# Patient Record
Sex: FEMALE | Race: WHITE | NOT HISPANIC OR LATINO | Employment: FULL TIME | URBAN - METROPOLITAN AREA
[De-identification: names, ages, dates, MRNs, and addresses within clinical notes are randomized per-mention and may not be internally consistent; named-entity substitution may affect disease eponyms.]

---

## 2019-03-06 ENCOUNTER — CONSULT (OUTPATIENT)
Dept: GYNECOLOGIC ONCOLOGY | Facility: HOSPITAL | Age: 49
End: 2019-03-06

## 2019-03-06 VITALS
TEMPERATURE: 98.3 F | DIASTOLIC BLOOD PRESSURE: 80 MMHG | BODY MASS INDEX: 25.69 KG/M2 | WEIGHT: 154.2 LBS | SYSTOLIC BLOOD PRESSURE: 122 MMHG | HEIGHT: 65 IN | HEART RATE: 75 BPM

## 2019-03-06 DIAGNOSIS — D06.9 CIN III (CERVICAL INTRAEPITHELIAL NEOPLASIA GRADE III) WITH SEVERE DYSPLASIA: Primary | ICD-10-CM

## 2019-03-06 PROCEDURE — 99243 OFF/OP CNSLTJ NEW/EST LOW 30: CPT | Performed by: OBSTETRICS & GYNECOLOGY

## 2019-03-06 RX ORDER — ACETAMINOPHEN 500 MG
500 TABLET ORAL EVERY 6 HOURS PRN
COMMUNITY

## 2019-03-06 NOTE — ASSESSMENT & PLAN NOTE
41-year-old who does not desire future fertility with recurrent high-grade cervical dysplasia  Her performance status is 0   1  I discussed the pathophysiology of cervical dysplasia including the role tobacco use plays in progression to cancer  I encouraged her to quit smoking  2  Her ECC is positive for CARLOS 2, 3  I recommend hysterectomy with bilateral salpingectomy  She understands that postoperatively she would require Pap smears in follow-up  The likelihood that she has occult underlying malignancy is low  Thank you for the courtesy of this consultation  All questions were answered by the end of the visit

## 2019-03-06 NOTE — LETTER
March 6, 2019     Ulysses Homans, MD  [address]  [address] [facility] [address]    Patient: Festus Marmolejo   YOB: 1970   Date of Visit: 3/6/2019       Dear Dr Starr Feliciano: Thank you for referring Festus Marmolejo to me for evaluation  Below are my notes for this consultation  If you have questions, please do not hesitate to call me  I look forward to following your patient along with you  Sincerely,        Teo Rodas MD        CC: No Recipients  Teo Rodas MD  3/6/2019 11:32 AM  Sign at close encounter  Assessment/Plan:    Problem List Items Addressed This Visit        Genitourinary    CARLOS III (cervical intraepithelial neoplasia grade III) with severe dysplasia - Primary     16-year-old who does not desire future fertility with recurrent high-grade cervical dysplasia  Her performance status is 0   1  I discussed the pathophysiology of cervical dysplasia including the role tobacco use plays in progression to cancer  I encouraged her to quit smoking  2  Her ECC is positive for CARLOS 2, 3  I recommend hysterectomy with bilateral salpingectomy  She understands that postoperatively she would require Pap smears in follow-up  The likelihood that she has occult underlying malignancy is low  Thank you for the courtesy of this consultation  All questions were answered by the end of the visit  CHIEF COMPLAINT:  Recurrent high-grade dysplasia of the cervix          Patient ID: Festus Marmolejo is a 51 y o  female  16-year-old presents as a consultation from Dr Ulysses Homans for recurrent high-grade dysplasia of the cervix  In July of 2018, she had a LEEP procedure performed  The ECC was positive for CARLOS 2  She then had a follow-up Pap smear performed on 2/21/2019 that was high-grade with the ECC as positive for CARLOS 2, 3  She has oligomenorrhea  She does not bleed irregularly  She is a smoker  No pelvic pain  Review of Systems   Constitutional: Positive for fatigue  Genitourinary: Positive for pelvic pain and vaginal discharge  Musculoskeletal: Positive for arthralgias and myalgias  All other systems reviewed and are negative  Current Outpatient Medications   Medication Sig Dispense Refill    acetaminophen (TYLENOL) 500 mg tablet Take 500 mg by mouth every 6 (six) hours as needed for mild pain       No current facility-administered medications for this visit  No Known Allergies    No past medical history on file  Past Surgical History:   Procedure Laterality Date    CERVICAL BIOPSY  W/ LOOP ELECTRODE EXCISION         OB History        5    Para   4    Term                AB        Living   4       SAB        TAB        Ectopic        Multiple        Live Births               Obstetric Comments   Age at first period 7             Family History   Problem Relation Age of Onset    Colon cancer Mother        The following portions of the patient's history were reviewed and updated as appropriate: allergies, current medications, past family history, past medical history, past social history, past surgical history and problem list       Objective:    Blood pressure 122/80, pulse 75, temperature 98 3 °F (36 8 °C), temperature source Oral, height 5' 5" (1 651 m), weight 69 9 kg (154 lb 3 2 oz)  Body mass index is 25 66 kg/m²  Physical Exam   Constitutional: She is oriented to person, place, and time  She appears well-developed and well-nourished  No distress  HENT:   Head: Normocephalic and atraumatic  Neck: Normal range of motion  Neck supple  No thyromegaly present  Abdominal: Soft  She exhibits no distension and no mass  There is no tenderness  There is no rebound and no guarding  Genitourinary:   Genitourinary Comments: The external female genitalia is normal  The bartholin's, uretheral and skenes glands are normal  The urethral meatus is normal (midline with no lesions)  Anus without fissure or lesion   Speculum exam reveals vagina without lesion or discharge  Cervix is normal appearing without visible lesions  There is scarring present from her previous LEEP  No bleeding  No significant cystocele or rectocele noted  Bimanual exam notes a uterus with normal contour, mobility  No tenderness  Adnexa without masses or tenderness  Bladder is without fullness, mass or tenderness  Musculoskeletal: She exhibits no edema  Lymphadenopathy:     She has no cervical adenopathy  Neurological: She is alert and oriented to person, place, and time  Skin: Skin is warm and dry  She is not diaphoretic  Psychiatric: She has a normal mood and affect   Her behavior is normal  Judgment and thought content normal

## 2019-03-06 NOTE — PROGRESS NOTES
Assessment/Plan:    Problem List Items Addressed This Visit        Genitourinary    CARLOS III (cervical intraepithelial neoplasia grade III) with severe dysplasia - Primary     54-year-old who does not desire future fertility with recurrent high-grade cervical dysplasia  Her performance status is 0   1  I discussed the pathophysiology of cervical dysplasia including the role tobacco use plays in progression to cancer  I encouraged her to quit smoking  2  Her ECC is positive for CARLOS 2, 3  I recommend hysterectomy with bilateral salpingectomy  She understands that postoperatively she would require Pap smears in follow-up  The likelihood that she has occult underlying malignancy is low  Thank you for the courtesy of this consultation  All questions were answered by the end of the visit  CHIEF COMPLAINT:  Recurrent high-grade dysplasia of the cervix          Patient ID: Vivi Owens is a 51 y o  female  54-year-old presents as a consultation from Dr Maura Cordero for recurrent high-grade dysplasia of the cervix  In July of 2018, she had a LEEP procedure performed  The ECC was positive for CARLOS 2  She then had a follow-up Pap smear performed on 2/21/2019 that was high-grade with the ECC as positive for CARLOS 2, 3  She has oligomenorrhea  She does not bleed irregularly  She is a smoker  No pelvic pain  Review of Systems   Constitutional: Positive for fatigue  Genitourinary: Positive for pelvic pain and vaginal discharge  Musculoskeletal: Positive for arthralgias and myalgias  All other systems reviewed and are negative  Current Outpatient Medications   Medication Sig Dispense Refill    acetaminophen (TYLENOL) 500 mg tablet Take 500 mg by mouth every 6 (six) hours as needed for mild pain       No current facility-administered medications for this visit  No Known Allergies    No past medical history on file      Past Surgical History:   Procedure Laterality Date    CERVICAL BIOPSY  W/ LOOP ELECTRODE EXCISION         OB History        5    Para   4    Term                AB        Living   4       SAB        TAB        Ectopic        Multiple        Live Births               Obstetric Comments   Age at first period 7             Family History   Problem Relation Age of Onset    Colon cancer Mother        The following portions of the patient's history were reviewed and updated as appropriate: allergies, current medications, past family history, past medical history, past social history, past surgical history and problem list       Objective:    Blood pressure 122/80, pulse 75, temperature 98 3 °F (36 8 °C), temperature source Oral, height 5' 5" (1 651 m), weight 69 9 kg (154 lb 3 2 oz)  Body mass index is 25 66 kg/m²  Physical Exam   Constitutional: She is oriented to person, place, and time  She appears well-developed and well-nourished  No distress  HENT:   Head: Normocephalic and atraumatic  Neck: Normal range of motion  Neck supple  No thyromegaly present  Abdominal: Soft  She exhibits no distension and no mass  There is no tenderness  There is no rebound and no guarding  Genitourinary:   Genitourinary Comments: The external female genitalia is normal  The bartholin's, uretheral and skenes glands are normal  The urethral meatus is normal (midline with no lesions)  Anus without fissure or lesion  Speculum exam reveals vagina without lesion or discharge  Cervix is normal appearing without visible lesions  There is scarring present from her previous LEEP  No bleeding  No significant cystocele or rectocele noted  Bimanual exam notes a uterus with normal contour, mobility  No tenderness  Adnexa without masses or tenderness  Bladder is without fullness, mass or tenderness  Musculoskeletal: She exhibits no edema  Lymphadenopathy:     She has no cervical adenopathy  Neurological: She is alert and oriented to person, place, and time     Skin: Skin is warm and dry  She is not diaphoretic  Psychiatric: She has a normal mood and affect   Her behavior is normal  Judgment and thought content normal

## 2019-03-08 ENCOUNTER — DOCUMENTATION (OUTPATIENT)
Dept: HEMATOLOGY ONCOLOGY | Facility: CLINIC | Age: 49
End: 2019-03-08

## 2019-03-08 NOTE — PROGRESS NOTES
Spoke with pt regarding the fact that she does not have insurance  Pt is employed full time however she is not eligible for insurance through her employer until 1/2020  Pt agreed to meet with me on 3 14 19 in our [facility] office  We are going to complete [place] application together

## 2024-02-02 ENCOUNTER — TELEPHONE (OUTPATIENT)
Age: 54
End: 2024-02-02

## 2024-02-02 ENCOUNTER — PREP FOR PROCEDURE (OUTPATIENT)
Age: 54
End: 2024-02-02

## 2024-02-02 DIAGNOSIS — Z12.11 SCREENING FOR COLON CANCER: Primary | ICD-10-CM

## 2024-02-02 NOTE — TELEPHONE ENCOUNTER
02/02/24  Screened by: Yandy Castañeda    Referring Provider Emily Rivers    Pre- Screening:     There is no height or weight on file to calculate BMI.  Has patient been referred for a routine screening Colonoscopy? yes  Is the patient between 45-75 years old? yes      Previous Colonoscopy no   If yes:    Date:     Facility:     Reason:         Does the patient want to see a Gastroenterologist prior to their procedure OR are they having any GI symptoms? no    Has the patient been hospitalized or had abdominal surgery in the past 6 months? no    Does the patient use supplemental oxygen? no    Does the patient take Coumadin, Lovenox, Plavix, Elliquis, Xarelto, or other blood thinning medication? no    Has the patient had a stroke, cardiac event, or stent placed in the past year? no

## 2024-02-02 NOTE — TELEPHONE ENCOUNTER
Scheduled date of colonoscopy (as of today):4/8/24  Physician performing colonoscopy:Dr. Mae  Location of colonoscopy:[facility]  Bowel prep reviewed with patient:Jairo/Dul prep instr sent to pt email [email or web address]  Instructions reviewed with patient by:JUWAN  Clearances: N/A

## 2024-03-29 ENCOUNTER — TELEPHONE (OUTPATIENT)
Dept: GASTROENTEROLOGY | Facility: CLINIC | Age: 54
End: 2024-03-29

## 2024-03-29 NOTE — TELEPHONE ENCOUNTER
Patient called in to reschedule procedure due to a conflict in schedule patient is now rebecca on   4/19 DR BERNABE ALMONTE

## 2024-04-12 ENCOUNTER — TELEPHONE (OUTPATIENT)
Dept: GASTROENTEROLOGY | Facility: CLINIC | Age: 54
End: 2024-04-12

## 2024-04-19 ENCOUNTER — ANESTHESIA (OUTPATIENT)
Dept: GASTROENTEROLOGY | Facility: HOSPITAL | Age: 54
End: 2024-04-19

## 2024-04-19 ENCOUNTER — HOSPITAL ENCOUNTER (OUTPATIENT)
Dept: GASTROENTEROLOGY | Facility: HOSPITAL | Age: 54
Setting detail: OUTPATIENT SURGERY
Discharge: HOME | End: 2024-04-19
Attending: INTERNAL MEDICINE
Payer: COMMERCIAL

## 2024-04-19 ENCOUNTER — ANESTHESIA EVENT (OUTPATIENT)
Dept: GASTROENTEROLOGY | Facility: HOSPITAL | Age: 54
End: 2024-04-19

## 2024-04-19 VITALS
HEIGHT: 66 IN | SYSTOLIC BLOOD PRESSURE: 130 MMHG | HEART RATE: 60 BPM | TEMPERATURE: 97.5 F | DIASTOLIC BLOOD PRESSURE: 72 MMHG | OXYGEN SATURATION: 99 % | RESPIRATION RATE: 16 BRPM | WEIGHT: 184.08 LBS | BODY MASS INDEX: 29.58 KG/M2

## 2024-04-19 DIAGNOSIS — Z12.11 SCREENING FOR COLON CANCER: ICD-10-CM

## 2024-04-19 DIAGNOSIS — K59.00 CONSTIPATION, UNSPECIFIED CONSTIPATION TYPE: Primary | ICD-10-CM

## 2024-04-19 PROCEDURE — G0105 COLORECTAL SCRN; HI RISK IND: HCPCS | Performed by: INTERNAL MEDICINE

## 2024-04-19 RX ORDER — PROPOFOL 10 MG/ML
INJECTION, EMULSION INTRAVENOUS AS NEEDED
Status: DISCONTINUED | OUTPATIENT
Start: 2024-04-19 | End: 2024-04-19

## 2024-04-19 RX ORDER — AMITRIPTYLINE HYDROCHLORIDE 10 MG/1
10 TABLET, FILM COATED ORAL
COMMUNITY

## 2024-04-19 RX ORDER — SODIUM CHLORIDE, SODIUM LACTATE, POTASSIUM CHLORIDE, CALCIUM CHLORIDE 600; 310; 30; 20 MG/100ML; MG/100ML; MG/100ML; MG/100ML
20 INJECTION, SOLUTION INTRAVENOUS CONTINUOUS
Status: CANCELLED | OUTPATIENT
Start: 2024-04-19

## 2024-04-19 RX ORDER — SODIUM CHLORIDE, SODIUM LACTATE, POTASSIUM CHLORIDE, CALCIUM CHLORIDE 600; 310; 30; 20 MG/100ML; MG/100ML; MG/100ML; MG/100ML
INJECTION, SOLUTION INTRAVENOUS CONTINUOUS PRN
Status: DISCONTINUED | OUTPATIENT
Start: 2024-04-19 | End: 2024-04-19

## 2024-04-19 RX ORDER — PROPOFOL 10 MG/ML
INJECTION, EMULSION INTRAVENOUS CONTINUOUS PRN
Status: DISCONTINUED | OUTPATIENT
Start: 2024-04-19 | End: 2024-04-19

## 2024-04-19 RX ORDER — POLYETHYLENE GLYCOL 3350 17 G/17G
34 POWDER, FOR SOLUTION ORAL DAILY
Qty: 850 G | Refills: 3 | Status: SHIPPED | OUTPATIENT
Start: 2024-04-19

## 2024-04-19 RX ORDER — LEVOTHYROXINE SODIUM 0.1 MG/1
100 TABLET ORAL DAILY
COMMUNITY

## 2024-04-19 RX ORDER — ROSUVASTATIN CALCIUM 5 MG/1
5 TABLET, COATED ORAL DAILY
COMMUNITY
Start: 2024-02-02

## 2024-04-19 RX ADMIN — SODIUM CHLORIDE, SODIUM LACTATE, POTASSIUM CHLORIDE, AND CALCIUM CHLORIDE: .6; .31; .03; .02 INJECTION, SOLUTION INTRAVENOUS at 09:24

## 2024-04-19 RX ADMIN — PROPOFOL 100 MCG/KG/MIN: 10 INJECTION, EMULSION INTRAVENOUS at 09:35

## 2024-04-19 RX ADMIN — PROPOFOL 100 MG: 10 INJECTION, EMULSION INTRAVENOUS at 09:35

## 2024-04-19 NOTE — H&P
"[facility] and Physical -  [facility]  Susan Cintron 53 y.o. female MRN: [id number]    HPI: Susan Cintron is a 53 y.o. female who presents for colonoscopy for colorectal cancer screening.  Family history of colon cancer in her mother.  History of constipation.  Bowel movement once every 2 weeks.    REVIEW OF SYSTEMS: Per the HPI, and otherwise unremarkable.    Historical Information   No past medical history on file.  Past Surgical History:   Procedure Laterality Date    CERVICAL BIOPSY  W/ LOOP ELECTRODE EXCISION       Social History   Social History     Substance and Sexual Activity   Alcohol Use Yes    Comment: once a month     Social History     Substance and Sexual Activity   Drug Use Never     Social History     Tobacco Use   Smoking Status Every Day    Current packs/day: 1.50    Types: Cigarettes   Smokeless Tobacco Never     Family History   Problem Relation Age of Onset    Colon cancer Mother        Meds/Allergies       Current Outpatient Medications:     acetaminophen (TYLENOL) 500 mg tablet    amitriptyline (ELAVIL) 10 mg tablet    levothyroxine 100 mcg tablet    rosuvastatin (CRESTOR) 5 mg tablet    No Known Allergies    Objective     /74   Pulse 76   Temp 97.5 °F (36.4 °C) (Temporal)   Resp 16   Ht 5' 6\" (1.676 m)   Wt 83.5 kg (184 lb 1.4 oz)   SpO2 99%   BMI 29.71 kg/m²     PHYSICAL EXAM    Gen: NAD AAOx3  Head: Normocephalic, Atraumatic  CV: S1S2 RRR no m/r/g  CHEST: Clear b/l no c/r/w  ABD: soft, +BS NT/ND  EXT: no edema    ASSESSMENT/PLAN:  This is a 53 y.o. year old female here for colonoscopy, and she is stable and optimized for her procedure.        "

## 2024-04-19 NOTE — ANESTHESIA PREPROCEDURE EVALUATION
Procedure:  COLONOSCOPY    Relevant Problems   No relevant active problems   Hypothyroid, Depression    Physical Exam    Airway    Mallampati score: II  TM Distance: >3 FB  Neck ROM: full     Dental       Cardiovascular      Pulmonary      Other Findings  post-pubertal.      Anesthesia Plan  ASA Score- 2     Anesthesia Type- IV sedation with anesthesia with ASA Monitors.         Additional Monitors:     Airway Plan:            Plan Factors-    Chart reviewed.                      Induction- intravenous.    Postoperative Plan-     Informed Consent- Anesthetic plan and risks discussed with patient.  I personally reviewed this patient with the CRNA. Discussed and agreed on the Anesthesia Plan with the CRNA..

## 2024-04-19 NOTE — ANESTHESIA POSTPROCEDURE EVALUATION
Post-Op Assessment Note    CV Status:  Stable  Pain Score: 0    Pain management: adequate       Mental Status:  Sleepy   Hydration Status:  Stable   PONV Controlled:  None   Airway Patency:  Patent     Post Op Vitals Reviewed: Yes    No anethesia notable event occurred.    Staff: CRNA               BP   129/71   Temp      Pulse  68   Resp   15   SpO2   96

## 2024-04-22 ENCOUNTER — TELEPHONE (OUTPATIENT)
Age: 54
End: 2024-04-22

## 2024-04-22 NOTE — TELEPHONE ENCOUNTER
Patients GI provider:  Dr. KIDD     Number to return call: ( [phone]     Reason for call: Pt calling Inadequate bowel preparation  For at least 2 week prior to next colonoscopy take MiraLAX 17 g 1-2  times daily for at least 2 weeks prior to bowel preparation of Golytely    Scheduled procedure/appointment date if applicable: Apt/procedure  4/19  colonoscopy needs repeat 6 mths

## 2024-04-23 ENCOUNTER — PREP FOR PROCEDURE (OUTPATIENT)
Dept: GASTROENTEROLOGY | Facility: CLINIC | Age: 54
End: 2024-04-23

## 2024-04-23 DIAGNOSIS — Z53.9 PROCEDURE DISCONTINUED: Primary | ICD-10-CM

## 2024-10-08 ENCOUNTER — PREP FOR PROCEDURE (OUTPATIENT)
Age: 54
End: 2024-10-08

## 2024-10-08 ENCOUNTER — TELEPHONE (OUTPATIENT)
Age: 54
End: 2024-10-08

## 2024-10-08 DIAGNOSIS — Z12.11 SCREENING FOR COLON CANCER: Primary | ICD-10-CM

## 2024-10-08 DIAGNOSIS — Z80.0 FAMILY HX OF COLON CANCER: Primary | ICD-10-CM

## 2024-10-08 DIAGNOSIS — Z80.0 FAMILY HISTORY OF COLON CANCER: ICD-10-CM

## 2024-10-08 DIAGNOSIS — Z12.11 SPECIAL SCREENING FOR MALIGNANT NEOPLASMS, COLON: ICD-10-CM

## 2024-10-08 NOTE — TELEPHONE ENCOUNTER
"Scheduled date of colonoscopy (as of today): 10/30/2024  Physician performing colonoscopy: DR MAE  Location of colonoscopy: [facility]  Bowel prep reviewed with patient: golytely with special instructions 2 wks prior  Instructions reviewed with patient by: Kym via telephone. Procedure directions sent via [employer] and email at [email or web address]  Clearances: n/a    Special Instructions as per Dr. Mae     \"For at least 2 week prior to next colonoscopy take MiraLAX 17 g 1-2 times daily for at least 2 weeks prior to bowel preparation of GoLytely.\"  "

## 2024-10-08 NOTE — TELEPHONE ENCOUNTER
10/08/24  Screened by: Kym Rahman MA    Referring Provider : 6 month repeat    Pre- Screening:     There is no height or weight on file to calculate BMI.  Has patient been referred for a routine screening Colonoscopy? yes  Is the patient between 45-75 years old? yes      Previous Colonoscopy yes   If yes:    Date: 04/19/2024    Facility:     Reason: family hx, screening          Does the patient want to see a Gastroenterologist prior to their procedure OR are they having any GI symptoms? no    Has the patient been hospitalized or had abdominal surgery in the past 6 months? no    Does the patient use supplemental oxygen? no    Does the patient take Coumadin, Lovenox, Plavix, Elliquis, Xarelto, or other blood thinning medication? no    Has the patient had a stroke, cardiac event, or stent placed in the past year? no      If patient is between 45yrs - 49yrs, please advise patient that we will have to confirm benefits & coverage with their insurance company for a routine screening colonoscopy.

## 2024-10-29 RX ORDER — SODIUM CHLORIDE 9 MG/ML
100 INJECTION, SOLUTION INTRAVENOUS CONTINUOUS
Status: CANCELLED | OUTPATIENT
Start: 2024-10-29

## 2024-10-30 ENCOUNTER — HOSPITAL ENCOUNTER (OUTPATIENT)
Dept: GASTROENTEROLOGY | Facility: HOSPITAL | Age: 54
Setting detail: OUTPATIENT SURGERY
Discharge: HOME | End: 2024-10-30
Attending: INTERNAL MEDICINE
Payer: COMMERCIAL

## 2024-10-30 ENCOUNTER — ANESTHESIA (OUTPATIENT)
Dept: GASTROENTEROLOGY | Facility: HOSPITAL | Age: 54
End: 2024-10-30
Payer: COMMERCIAL

## 2024-10-30 ENCOUNTER — ANESTHESIA EVENT (OUTPATIENT)
Dept: GASTROENTEROLOGY | Facility: HOSPITAL | Age: 54
End: 2024-10-30
Payer: COMMERCIAL

## 2024-10-30 VITALS
HEART RATE: 60 BPM | DIASTOLIC BLOOD PRESSURE: 77 MMHG | SYSTOLIC BLOOD PRESSURE: 145 MMHG | RESPIRATION RATE: 20 BRPM | OXYGEN SATURATION: 99 % | TEMPERATURE: 98.1 F

## 2024-10-30 DIAGNOSIS — Z80.0 FAMILY HX OF COLON CANCER: ICD-10-CM

## 2024-10-30 DIAGNOSIS — Z12.11 SPECIAL SCREENING FOR MALIGNANT NEOPLASMS, COLON: ICD-10-CM

## 2024-10-30 PROBLEM — E78.5 HYPERLIPIDEMIA: Status: ACTIVE | Noted: 2024-10-30

## 2024-10-30 PROBLEM — IMO0001 SMOKING: Status: ACTIVE | Noted: 2024-10-30

## 2024-10-30 PROBLEM — F32.A DEPRESSION: Status: ACTIVE | Noted: 2024-10-30

## 2024-10-30 PROBLEM — F17.200 SMOKING: Status: ACTIVE | Noted: 2024-10-30

## 2024-10-30 PROBLEM — E03.9 HYPOTHYROIDISM: Status: ACTIVE | Noted: 2024-10-30

## 2024-10-30 PROCEDURE — 88305 TISSUE EXAM BY PATHOLOGIST: CPT | Performed by: PATHOLOGY

## 2024-10-30 PROCEDURE — 45385 COLONOSCOPY W/LESION REMOVAL: CPT | Performed by: INTERNAL MEDICINE

## 2024-10-30 PROCEDURE — 45390 COLONOSCOPY W/RESECTION: CPT | Performed by: INTERNAL MEDICINE

## 2024-10-30 RX ORDER — SODIUM CHLORIDE 9 MG/ML
INJECTION, SOLUTION INTRAVENOUS CONTINUOUS PRN
Status: DISCONTINUED | OUTPATIENT
Start: 2024-10-30 | End: 2024-10-30

## 2024-10-30 RX ORDER — LIDOCAINE HYDROCHLORIDE 10 MG/ML
INJECTION, SOLUTION EPIDURAL; INFILTRATION; INTRACAUDAL; PERINEURAL AS NEEDED
Status: DISCONTINUED | OUTPATIENT
Start: 2024-10-30 | End: 2024-10-30

## 2024-10-30 RX ORDER — PROPOFOL 10 MG/ML
INJECTION, EMULSION INTRAVENOUS AS NEEDED
Status: DISCONTINUED | OUTPATIENT
Start: 2024-10-30 | End: 2024-10-30

## 2024-10-30 RX ADMIN — PROPOFOL 20 MG: 10 INJECTION, EMULSION INTRAVENOUS at 09:46

## 2024-10-30 RX ADMIN — PROPOFOL 20 MG: 10 INJECTION, EMULSION INTRAVENOUS at 10:02

## 2024-10-30 RX ADMIN — PROPOFOL 100 MG: 10 INJECTION, EMULSION INTRAVENOUS at 09:36

## 2024-10-30 RX ADMIN — PROPOFOL 20 MG: 10 INJECTION, EMULSION INTRAVENOUS at 09:42

## 2024-10-30 RX ADMIN — PROPOFOL 30 MG: 10 INJECTION, EMULSION INTRAVENOUS at 09:52

## 2024-10-30 RX ADMIN — SODIUM CHLORIDE: 0.9 INJECTION, SOLUTION INTRAVENOUS at 09:27

## 2024-10-30 RX ADMIN — LIDOCAINE HYDROCHLORIDE 50 MG: 10 INJECTION, SOLUTION EPIDURAL; INFILTRATION; INTRACAUDAL; PERINEURAL at 09:36

## 2024-10-30 RX ADMIN — PROPOFOL 30 MG: 10 INJECTION, EMULSION INTRAVENOUS at 09:38

## 2024-10-30 RX ADMIN — PROPOFOL 30 MG: 10 INJECTION, EMULSION INTRAVENOUS at 09:40

## 2024-10-30 RX ADMIN — PROPOFOL 20 MG: 10 INJECTION, EMULSION INTRAVENOUS at 09:49

## 2024-10-30 RX ADMIN — PROPOFOL 30 MG: 10 INJECTION, EMULSION INTRAVENOUS at 09:57

## 2024-10-30 NOTE — ANESTHESIA POSTPROCEDURE EVALUATION
Post-Op Assessment Note    CV Status:  Stable  Pain Score: 0    Pain management: adequate       Mental Status:  Sleepy   Hydration Status:  Stable and euvolemic   PONV Controlled:  None   Airway Patency:  Patent     Post Op Vitals Reviewed: Yes    No anethesia notable event occurred.    Staff: CRNA           Last Filed PACU Vitals:  Vitals Value Taken Time   Temp     Pulse 61    /71    Resp 16    SpO2 98        Modified Ivy:  Activity: 2 (10/30/2024  8:53 AM)  Respiration: 2 (10/30/2024  8:53 AM)  Circulation: 2 (10/30/2024  8:53 AM)  Consciousness: 2 (10/30/2024  8:53 AM)  Oxygen Saturation: 2 (10/30/2024  8:53 AM)  Modified Ivy Score: 10 (10/30/2024  8:53 AM)

## 2024-10-30 NOTE — ANESTHESIA PREPROCEDURE EVALUATION
Procedure:  COLONOSCOPY    Relevant Problems   ANESTHESIA (within normal limits)  Colonoscopy 6 mo ago - poor prep - no issues with sedation      CARDIO   (+) Hyperlipidemia      ENDO   (+) Hypothyroidism      NEURO/PSYCH   (+) Depression      PULMONARY   (+) Smoking   (-) Sleep apnea   (-) URI (upper respiratory infection)        Physical Exam    Airway    Mallampati score: I  TM Distance: >3 FB  Neck ROM: full     Dental   No notable dental hx     Cardiovascular      Pulmonary      Other Findings  post-pubertal.      Anesthesia Plan  ASA Score- 2     Anesthesia Type- IV sedation with anesthesia with ASA Monitors.         Additional Monitors:     Airway Plan:            Plan Factors-Exercise tolerance (METS): >4 METS.    Chart reviewed.   Existing labs reviewed. Patient summary reviewed.    Patient is a current smoker.              Induction- intravenous.    Postoperative Plan-         Informed Consent- Anesthetic plan and risks discussed with patient.  I personally reviewed this patient with the CRNA. Discussed and agreed on the Anesthesia Plan with the CRNA..

## 2024-10-30 NOTE — H&P
History and Physical - [facility]  Susan Cintron 54 y.o. female MRN: [id number]    HPI: Susan Cintron is a 54 y.o. female who presents for colonoscopy for colorectal cancer screening.    REVIEW OF SYSTEMS: Per the HPI, and otherwise unremarkable.    Historical Information   History reviewed. No pertinent past medical history.  Past Surgical History:   Procedure Laterality Date    CERVICAL BIOPSY  W/ LOOP ELECTRODE EXCISION       Social History   Social History     Substance and Sexual Activity   Alcohol Use Yes    Comment: once a month     Social History     Substance and Sexual Activity   Drug Use Never     Social History     Tobacco Use   Smoking Status Every Day    Current packs/day: 1.50    Types: Cigarettes   Smokeless Tobacco Never     Family History   Problem Relation Age of Onset    Colon cancer Mother        Meds/Allergies       Current Outpatient Medications:     amitriptyline (ELAVIL) 10 mg tablet    levothyroxine 100 mcg tablet    rosuvastatin (CRESTOR) 5 mg tablet    acetaminophen (TYLENOL) 500 mg tablet    polyethylene glycol (GLYCOLAX) 17 GM/SCOOP powder    polyethylene glycol (GOLYTELY) 4000 mL solution    No Known Allergies    Objective     /68   Pulse 63   Temp 98.3 °F (36.8 °C) (Temporal)   Resp 18   SpO2 97%     PHYSICAL EXAM    Gen: NAD AAOx3  Head: Normocephalic, Atraumatic  CV: S1S2 RRR no m/r/g  CHEST: Clear b/l no c/r/w  ABD: soft, +BS NT/ND  EXT: no edema    ASSESSMENT/PLAN:  This is a 54 y.o. female here for colonoscopy, and she is stable and optimized for her procedure.

## 2024-11-05 PROCEDURE — 88305 TISSUE EXAM BY PATHOLOGIST: CPT | Performed by: PATHOLOGY

## 2025-03-07 ENCOUNTER — TELEPHONE (OUTPATIENT)
Age: 55
End: 2025-03-07

## 2025-03-07 ENCOUNTER — PREP FOR PROCEDURE (OUTPATIENT)
Age: 55
End: 2025-03-07

## 2025-03-07 DIAGNOSIS — Z86.0100 PERSONAL HISTORY OF COLON POLYPS, UNSPECIFIED: Primary | ICD-10-CM

## 2025-03-07 NOTE — TELEPHONE ENCOUNTER
Scheduled date of colonoscopy (as of today): 06/09/25  Physician performing colonoscopy: Dr. Mae  Location of colonoscopy: [facility]   Bowel prep reviewed with patient: [email or web address]/ANNEL via email [email or web address]    Instructions reviewed with patient by: Anyi  Clearances:

## 2025-05-08 ENCOUNTER — OFFICE VISIT (OUTPATIENT)
Dept: OBGYN CLINIC | Facility: CLINIC | Age: 55
End: 2025-05-08
Payer: COMMERCIAL

## 2025-05-08 VITALS
WEIGHT: 184 LBS | DIASTOLIC BLOOD PRESSURE: 76 MMHG | SYSTOLIC BLOOD PRESSURE: 112 MMHG | HEIGHT: 66 IN | BODY MASS INDEX: 29.57 KG/M2

## 2025-05-08 DIAGNOSIS — N95.2 ATROPHIC VAGINITIS: Primary | ICD-10-CM

## 2025-05-08 DIAGNOSIS — N95.1 HOT FLASHES DUE TO MENOPAUSE: ICD-10-CM

## 2025-05-08 DIAGNOSIS — R10.84 GENERALIZED ABDOMINAL PAIN: ICD-10-CM

## 2025-05-08 DIAGNOSIS — Z90.710 HISTORY OF HYSTERECTOMY: ICD-10-CM

## 2025-05-08 DIAGNOSIS — N94.10 DYSPAREUNIA IN FEMALE: ICD-10-CM

## 2025-05-08 DIAGNOSIS — R45.86 LABILE MOOD: ICD-10-CM

## 2025-05-08 DIAGNOSIS — N95.9 POSTMENOPAUSAL SYMPTOMS: ICD-10-CM

## 2025-05-08 DIAGNOSIS — Z87.410 PERSONAL HISTORY OF CERVICAL DYSPLASIA: ICD-10-CM

## 2025-05-08 PROCEDURE — 99215 OFFICE O/P EST HI 40 MIN: CPT | Performed by: OBSTETRICS & GYNECOLOGY

## 2025-05-08 RX ORDER — ESTRADIOL 10 UG/1
1 TABLET, FILM COATED VAGINAL 2 TIMES WEEKLY
Qty: 8 TABLET | Refills: 12 | Status: SHIPPED | OUTPATIENT
Start: 2025-05-08 | End: 2026-05-07

## 2025-05-08 RX ORDER — LEVOTHYROXINE SODIUM 75 UG/1
TABLET ORAL
COMMUNITY
Start: 2025-05-06

## 2025-05-08 RX ORDER — MULTIVITAMIN
1 TABLET ORAL DAILY
COMMUNITY

## 2025-05-08 RX ORDER — ALLOPURINOL 300 MG/1
TABLET ORAL
COMMUNITY
Start: 2025-05-06

## 2025-05-27 ENCOUNTER — TELEPHONE (OUTPATIENT)
Dept: GASTROENTEROLOGY | Facility: CLINIC | Age: 55
End: 2025-05-27

## 2025-05-27 NOTE — TELEPHONE ENCOUNTER
Procedure confirmed  Colonoscopy     Via: Voice mail    Instructions given: Email     Prep Given: Miralax/Dulcolax    Call the office if there are any questions.

## 2025-06-12 NOTE — PROGRESS NOTES
[facility] - [address], [address], [place] [address]    Assessment & Plan  Atrophic vaginitis       -  management with vaginal estrogen reviewed.  R&B, indication and mode of application reviewed.  Pt desires to start vaginal estrogen treatment    Orders:    estradiol (VAGIFEM, YUVAFEM) 10 MCG TABS vaginal tablet; Insert 1 tablet (10 mcg total) into the vagina 2 (two) times a week    Postmenopausal symptoms     - We dicussed the risks, benefits, and alternatives to starting hormone replacement therapy for vasomotor symptoms of menopause.  - We discussed that given that she still has a uterus in situ, that progesterone supplementation is critically important to prevent unopposed estrogen exposure to the endometrium which could pre-dispose her to disordered endometrium such as hyperplasia or malignancy.  - We discussed the risk-benefit profile of combined estrogen-progestin therapy in women age 50 to 59.  We discussed the 5-year increase risk of coronary heart disease (2.5 additional cases per 1000 women), invasive breast cancer (3 additional cases per 1000 women), stroke (2.5 additional cases per 1000 women), pulmonary embolism (3 additional cases per 1000 women).   - The patient declines to start HRT.       Dyspareunia in female     -  OTC vaginal lubricants, position and penetration technique during intercourse reviewed       Labile mood     -  Treatment with SSRI or HRT reviewed.  Pt declined both option     -  Encouraged to follow up with PCP or therapist for further evaluation and management.    -       Hot flashes due to menopause       - We dicussed the risks, benefits, and alternatives to starting hormone replacement therapy for vasomotor symptoms of menopause.  - We discussed that given that she still has a uterus in situ, that progesterone supplementation is critically important to prevent unopposed estrogen exposure to the endometrium which could pre-dispose her to disordered  endometrium such as hyperplasia or malignancy.  - We discussed the risk-benefit profile of combined estrogen-progestin therapy in women age 50 to 59.  We discussed the 5-year increase risk of coronary heart disease (2.5 additional cases per 1000 women), invasive breast cancer (3 additional cases per 1000 women), stroke (2.5 additional cases per 1000 women), pulmonary embolism (3 additional cases per 1000 women).   - The patient declines to start HRT.            History of hysterectomy     -  informed patient treatment with progesterone not indicated for prevention of endometrial cancer if she decides to proceed with HRT       Personal history of cervical dysplasia       -  underwent hysterectomy       Generalized abdominal pain     -  advised to follow up with PCP or G.I.         I have spent a total time of 45 minutes in caring for this patient on the day of the visit/encounter including Diagnostic results, Prognosis, Risks and benefits of tx options, Instructions for management, Patient and family education, Importance of tx compliance, Risk factor reductions, Impressions, Counseling / Coordination of care, Documenting in the medical record, Reviewing/placing orders in the medical record (including tests, medications, and/or procedures), and Obtaining or reviewing history  .      Subjective:   Susan Cintron is a 54 y.o.  female.    HPI:   Pt presents with multiple symptoms which she believes are related to menopause.  Desires to review management options        Gyn History  No LMP recorded. Patient has had a hysterectomy.       Last pap smear: 2019    She  reports being sexually active and has had partner(s) who are male. She reports using the following method of birth control/protection: Post-menopausal.       OB History      No past medical history on file.     Past Surgical History:  No date: CERVICAL BIOPSY  W/ LOOP ELECTRODE EXCISION  No date: PARTIAL HYSTERECTOMY      Comment:  2019 Dr Red  "    Social History[1]     Current Medications[2]    She has no known allergies..    ROS: Review of Systems   Constitutional:  Negative for activity change, chills and fever.   Genitourinary:  Positive for dyspareunia. Negative for genital sores, pelvic pain, vaginal bleeding, vaginal discharge and vaginal pain.       Objective:  /76 (BP Location: Left arm, Patient Position: Sitting, Cuff Size: Standard)   Ht 5' 6\" (1.676 m)   Wt 83.5 kg (184 lb)   BMI 29.70 kg/m²      Physical Exam         [1]   Social History  Tobacco Use    Smoking status: Former     Current packs/day: 1.50     Average packs/day: 1.5 packs/day for 30.0 years (45.0 ttl pk-yrs)     Types: Cigarettes     Passive exposure: Never    Smokeless tobacco: Never   Vaping Use    Vaping status: Never Used   Substance Use Topics    Alcohol use: Not Currently     Comment: once a month    Drug use: Never   [2]   Current Outpatient Medications:     acetaminophen (TYLENOL) 500 mg tablet, Take 500 mg by mouth every 6 (six) hours as needed for mild pain, Disp: , Rfl:     allopurinol (ZYLOPRIM) 300 mg tablet, , Disp: , Rfl:     amitriptyline (ELAVIL) 10 mg tablet, Take 10 mg by mouth daily at bedtime, Disp: , Rfl:     Cholecalciferol (VITAMIN D3) 1,000 units tablet, Take by mouth daily, Disp: , Rfl:     estradiol (VAGIFEM, YUVAFEM) 10 MCG TABS vaginal tablet, Insert 1 tablet (10 mcg total) into the vagina 2 (two) times a week, Disp: 8 tablet, Rfl: 12    levothyroxine 75 mcg tablet, , Disp: , Rfl:     Multiple Vitamin (multivitamin) tablet, Take 1 tablet by mouth daily, Disp: , Rfl:     rosuvastatin (CRESTOR) 5 mg tablet, Take 5 mg by mouth daily, Disp: , Rfl:     "